# Patient Record
Sex: FEMALE | Race: OTHER | NOT HISPANIC OR LATINO | URBAN - METROPOLITAN AREA
[De-identification: names, ages, dates, MRNs, and addresses within clinical notes are randomized per-mention and may not be internally consistent; named-entity substitution may affect disease eponyms.]

---

## 2018-12-18 ENCOUNTER — EMERGENCY (EMERGENCY)
Facility: HOSPITAL | Age: 48
LOS: 1 days | Discharge: HOME | End: 2018-12-18
Attending: EMERGENCY MEDICINE | Admitting: OBSTETRICS & GYNECOLOGY
Payer: MEDICAID

## 2018-12-18 VITALS
TEMPERATURE: 98 F | RESPIRATION RATE: 16 BRPM | OXYGEN SATURATION: 100 % | DIASTOLIC BLOOD PRESSURE: 75 MMHG | HEART RATE: 95 BPM | SYSTOLIC BLOOD PRESSURE: 130 MMHG

## 2018-12-18 LAB
ALBUMIN SERPL ELPH-MCNC: 4.3 G/DL — SIGNIFICANT CHANGE UP (ref 3.3–5)
ALP SERPL-CCNC: 79 U/L — SIGNIFICANT CHANGE UP (ref 40–120)
ALT FLD-CCNC: 9 U/L — SIGNIFICANT CHANGE UP (ref 4–33)
APTT BLD: 27.9 SEC — SIGNIFICANT CHANGE UP (ref 27.5–36.3)
AST SERPL-CCNC: 18 U/L — SIGNIFICANT CHANGE UP (ref 4–32)
BASOPHILS # BLD AUTO: 0.05 K/UL — SIGNIFICANT CHANGE UP (ref 0–0.2)
BASOPHILS NFR BLD AUTO: 0.6 % — SIGNIFICANT CHANGE UP (ref 0–2)
BILIRUB SERPL-MCNC: < 0.2 MG/DL — LOW (ref 0.2–1.2)
BUN SERPL-MCNC: 7 MG/DL — SIGNIFICANT CHANGE UP (ref 7–23)
CALCIUM SERPL-MCNC: 8.9 MG/DL — SIGNIFICANT CHANGE UP (ref 8.4–10.5)
CHLORIDE SERPL-SCNC: 106 MMOL/L — SIGNIFICANT CHANGE UP (ref 98–107)
CO2 SERPL-SCNC: 25 MMOL/L — SIGNIFICANT CHANGE UP (ref 22–31)
CREAT SERPL-MCNC: 0.62 MG/DL — SIGNIFICANT CHANGE UP (ref 0.5–1.3)
EOSINOPHIL # BLD AUTO: 0.2 K/UL — SIGNIFICANT CHANGE UP (ref 0–0.5)
EOSINOPHIL NFR BLD AUTO: 2.2 % — SIGNIFICANT CHANGE UP (ref 0–6)
GLUCOSE SERPL-MCNC: 107 MG/DL — HIGH (ref 70–99)
HCG SERPL-ACNC: < 5 MIU/ML — SIGNIFICANT CHANGE UP
HCT VFR BLD CALC: 27.8 % — LOW (ref 34.5–45)
HGB BLD-MCNC: 8.1 G/DL — LOW (ref 11.5–15.5)
IMM GRANULOCYTES # BLD AUTO: 0.03 # — SIGNIFICANT CHANGE UP
IMM GRANULOCYTES NFR BLD AUTO: 0.3 % — SIGNIFICANT CHANGE UP (ref 0–1.5)
INR BLD: 1.2 — HIGH (ref 0.88–1.17)
LYMPHOCYTES # BLD AUTO: 2.64 K/UL — SIGNIFICANT CHANGE UP (ref 1–3.3)
LYMPHOCYTES # BLD AUTO: 29.5 % — SIGNIFICANT CHANGE UP (ref 13–44)
MCHC RBC-ENTMCNC: 20.4 PG — LOW (ref 27–34)
MCHC RBC-ENTMCNC: 29.1 % — LOW (ref 32–36)
MCV RBC AUTO: 70 FL — LOW (ref 80–100)
MONOCYTES # BLD AUTO: 0.51 K/UL — SIGNIFICANT CHANGE UP (ref 0–0.9)
MONOCYTES NFR BLD AUTO: 5.7 % — SIGNIFICANT CHANGE UP (ref 2–14)
NEUTROPHILS # BLD AUTO: 5.51 K/UL — SIGNIFICANT CHANGE UP (ref 1.8–7.4)
NEUTROPHILS NFR BLD AUTO: 61.7 % — SIGNIFICANT CHANGE UP (ref 43–77)
NRBC # FLD: 0 — SIGNIFICANT CHANGE UP
PLATELET # BLD AUTO: 354 K/UL — SIGNIFICANT CHANGE UP (ref 150–400)
PMV BLD: 9.6 FL — SIGNIFICANT CHANGE UP (ref 7–13)
POTASSIUM SERPL-MCNC: 3.2 MMOL/L — LOW (ref 3.5–5.3)
POTASSIUM SERPL-SCNC: 3.2 MMOL/L — LOW (ref 3.5–5.3)
PROT SERPL-MCNC: 7.6 G/DL — SIGNIFICANT CHANGE UP (ref 6–8.3)
PROTHROM AB SERPL-ACNC: 13.4 SEC — HIGH (ref 9.8–13.1)
RBC # BLD: 3.97 M/UL — SIGNIFICANT CHANGE UP (ref 3.8–5.2)
RBC # FLD: 19 % — HIGH (ref 10.3–14.5)
RH IG SCN BLD-IMP: POSITIVE — SIGNIFICANT CHANGE UP
SODIUM SERPL-SCNC: 142 MMOL/L — SIGNIFICANT CHANGE UP (ref 135–145)
WBC # BLD: 8.94 K/UL — SIGNIFICANT CHANGE UP (ref 3.8–10.5)
WBC # FLD AUTO: 8.94 K/UL — SIGNIFICANT CHANGE UP (ref 3.8–10.5)

## 2018-12-18 PROCEDURE — 99285 EMERGENCY DEPT VISIT HI MDM: CPT

## 2018-12-18 PROCEDURE — 76830 TRANSVAGINAL US NON-OB: CPT | Mod: 26

## 2018-12-18 NOTE — ED PROVIDER NOTE - GENITOURINARY, MLM
+moderate bleeding from os, +old clots in vaginal vault +moderate bleeding from os, +old clots in vaginal vault (chaperone SHIRLENE Sebastian)

## 2018-12-18 NOTE — ED PROVIDER NOTE - OBJECTIVE STATEMENT
49 y/o F with no PHMx c/o  nonstop heavy bleeding with chucks starting this morning. Pt reports using more than 24 pads. Liquid Advil taken today for cramps. Associated symptoms nausea, tiredness. Pt states this is the 2nd time also happening last month. Last period x3 weeks, regular period once a month. Pt went to GYN took tests sonargram. Reports results come in next month. No medication taken everyday. NKDA.   Denies dizziness, fever, vomiting. Next GYN appointment next month.

## 2018-12-18 NOTE — ED ADULT TRIAGE NOTE - CHIEF COMPLAINT QUOTE
Pt states she started having heavy vaginal bleeding since this morning.  Pt states that her LMP was 3 weeks ago.  Pt denies PMH, denies daily medications

## 2018-12-19 ENCOUNTER — TRANSCRIPTION ENCOUNTER (OUTPATIENT)
Age: 48
End: 2018-12-19

## 2018-12-19 VITALS
HEART RATE: 88 BPM | DIASTOLIC BLOOD PRESSURE: 67 MMHG | RESPIRATION RATE: 16 BRPM | SYSTOLIC BLOOD PRESSURE: 113 MMHG | OXYGEN SATURATION: 100 % | TEMPERATURE: 98 F

## 2018-12-19 DIAGNOSIS — N93.9 ABNORMAL UTERINE AND VAGINAL BLEEDING, UNSPECIFIED: ICD-10-CM

## 2018-12-19 DIAGNOSIS — Z98.51 TUBAL LIGATION STATUS: Chronic | ICD-10-CM

## 2018-12-19 LAB
ANISOCYTOSIS BLD QL: SIGNIFICANT CHANGE UP
BASOPHILS # BLD AUTO: 0.07 K/UL — SIGNIFICANT CHANGE UP (ref 0–0.2)
BASOPHILS NFR BLD AUTO: 0.9 % — SIGNIFICANT CHANGE UP (ref 0–2)
BASOPHILS NFR SPEC: 0 % — SIGNIFICANT CHANGE UP (ref 0–2)
BLASTS # FLD: 0 % — SIGNIFICANT CHANGE UP (ref 0–0)
BLD GP AB SCN SERPL QL: NEGATIVE — SIGNIFICANT CHANGE UP
EOSINOPHIL # BLD AUTO: 0.19 K/UL — SIGNIFICANT CHANGE UP (ref 0–0.5)
EOSINOPHIL NFR BLD AUTO: 2.5 % — SIGNIFICANT CHANGE UP (ref 0–6)
EOSINOPHIL NFR FLD: 0 % — SIGNIFICANT CHANGE UP (ref 0–6)
GIANT PLATELETS BLD QL SMEAR: PRESENT — SIGNIFICANT CHANGE UP
HCT VFR BLD CALC: 26.2 % — LOW (ref 34.5–45)
HCT VFR BLD CALC: 29.9 % — LOW (ref 34.5–45)
HGB BLD-MCNC: 7.4 G/DL — LOW (ref 11.5–15.5)
HGB BLD-MCNC: 9.1 G/DL — LOW (ref 11.5–15.5)
HYPOCHROMIA BLD QL: SIGNIFICANT CHANGE UP
IMM GRANULOCYTES # BLD AUTO: 0.01 # — SIGNIFICANT CHANGE UP
IMM GRANULOCYTES NFR BLD AUTO: 0.1 % — SIGNIFICANT CHANGE UP (ref 0–1.5)
LYMPHOCYTES # BLD AUTO: 2.26 K/UL — SIGNIFICANT CHANGE UP (ref 1–3.3)
LYMPHOCYTES # BLD AUTO: 30.2 % — SIGNIFICANT CHANGE UP (ref 13–44)
LYMPHOCYTES NFR SPEC AUTO: 20 % — SIGNIFICANT CHANGE UP (ref 13–44)
MCHC RBC-ENTMCNC: 19.7 PG — LOW (ref 27–34)
MCHC RBC-ENTMCNC: 21.4 PG — LOW (ref 27–34)
MCHC RBC-ENTMCNC: 28.2 % — LOW (ref 32–36)
MCHC RBC-ENTMCNC: 30.4 % — LOW (ref 32–36)
MCV RBC AUTO: 69.9 FL — LOW (ref 80–100)
MCV RBC AUTO: 70.4 FL — LOW (ref 80–100)
METAMYELOCYTES # FLD: 0 % — SIGNIFICANT CHANGE UP (ref 0–1)
MICROCYTES BLD QL: SIGNIFICANT CHANGE UP
MONOCYTES # BLD AUTO: 0.6 K/UL — SIGNIFICANT CHANGE UP (ref 0–0.9)
MONOCYTES NFR BLD AUTO: 8 % — SIGNIFICANT CHANGE UP (ref 2–14)
MONOCYTES NFR BLD: 3.5 % — SIGNIFICANT CHANGE UP (ref 2–9)
MYELOCYTES NFR BLD: 0 % — SIGNIFICANT CHANGE UP (ref 0–0)
NEUTROPHIL AB SER-ACNC: 72.2 % — SIGNIFICANT CHANGE UP (ref 43–77)
NEUTROPHILS # BLD AUTO: 4.36 K/UL — SIGNIFICANT CHANGE UP (ref 1.8–7.4)
NEUTROPHILS NFR BLD AUTO: 58.3 % — SIGNIFICANT CHANGE UP (ref 43–77)
NEUTS BAND # BLD: 0 % — SIGNIFICANT CHANGE UP (ref 0–6)
NRBC # FLD: 0 — SIGNIFICANT CHANGE UP
NRBC # FLD: 0 — SIGNIFICANT CHANGE UP
OTHER - HEMATOLOGY %: 0 — SIGNIFICANT CHANGE UP
PLATELET # BLD AUTO: 340 K/UL — SIGNIFICANT CHANGE UP (ref 150–400)
PLATELET # BLD AUTO: 371 K/UL — SIGNIFICANT CHANGE UP (ref 150–400)
PLATELET COUNT - ESTIMATE: NORMAL — SIGNIFICANT CHANGE UP
PMV BLD: 10 FL — SIGNIFICANT CHANGE UP (ref 7–13)
PMV BLD: 10.3 FL — SIGNIFICANT CHANGE UP (ref 7–13)
POIKILOCYTOSIS BLD QL AUTO: SLIGHT — SIGNIFICANT CHANGE UP
POLYCHROMASIA BLD QL SMEAR: SLIGHT — SIGNIFICANT CHANGE UP
PROMYELOCYTES # FLD: 0 % — SIGNIFICANT CHANGE UP (ref 0–0)
RBC # BLD: 3.75 M/UL — LOW (ref 3.8–5.2)
RBC # BLD: 4.25 M/UL — SIGNIFICANT CHANGE UP (ref 3.8–5.2)
RBC # FLD: 18.3 % — HIGH (ref 10.3–14.5)
RBC # FLD: 18.8 % — HIGH (ref 10.3–14.5)
RH IG SCN BLD-IMP: POSITIVE — SIGNIFICANT CHANGE UP
VARIANT LYMPHS # BLD: 4.3 % — SIGNIFICANT CHANGE UP
WBC # BLD: 7.49 K/UL — SIGNIFICANT CHANGE UP (ref 3.8–10.5)
WBC # BLD: 8.37 K/UL — SIGNIFICANT CHANGE UP (ref 3.8–10.5)
WBC # FLD AUTO: 7.49 K/UL — SIGNIFICANT CHANGE UP (ref 3.8–10.5)
WBC # FLD AUTO: 8.37 K/UL — SIGNIFICANT CHANGE UP (ref 3.8–10.5)

## 2018-12-19 RX ORDER — SODIUM CHLORIDE 9 MG/ML
1000 INJECTION, SOLUTION INTRAVENOUS
Qty: 0 | Refills: 0 | Status: DISCONTINUED | OUTPATIENT
Start: 2018-12-19 | End: 2018-12-19

## 2018-12-19 RX ORDER — ACETAMINOPHEN 500 MG
975 TABLET ORAL EVERY 6 HOURS
Qty: 0 | Refills: 0 | Status: DISCONTINUED | OUTPATIENT
Start: 2018-12-19 | End: 2018-12-19

## 2018-12-19 RX ORDER — IBUPROFEN 200 MG
600 TABLET ORAL EVERY 6 HOURS
Qty: 0 | Refills: 0 | Status: DISCONTINUED | OUTPATIENT
Start: 2018-12-19 | End: 2018-12-19

## 2018-12-19 NOTE — DISCHARGE NOTE ADULT - CARE PLAN
Principal Discharge DX:	Vaginal bleeding  Goal:	Follow up for fibroids  Assessment and plan of treatment:	Follow up with an OBGYN within one to two weeks.   Return to the ED if any heavy vaginal bleeding >2 pads per hour for over two hours, or if any lightheadness or dizziness.

## 2018-12-19 NOTE — DISCHARGE NOTE ADULT - INSTRUCTIONS
Call MD for fever greater than 101, nausea, vomiting, increased pain, increase vaginal bleeding, or go to ER.

## 2018-12-19 NOTE — PROGRESS NOTE ADULT - ASSESSMENT
48y  LMP  admitted with vaginal bleeding in setting of aborting myoma. Receiving 1U PRBC at this time for downtrending HCT. Hemodynamically asymptomatic at this time. 48y  LMP  admitted with vaginal bleeding in setting of SM myoma at os. Receiving 1U PRBC at this time for downtrending HCT. Hemodynamically asymptomatic at this time. 48y  LMP  admitted with vaginal bleeding and SM myoma.Receiving 1U PRBC at this time for downtrending HCT. Hemodynamically asymptomatic at this time.

## 2018-12-19 NOTE — DISCHARGE NOTE ADULT - PATIENT PORTAL LINK FT
You can access the [employer] Patient Portal, offered by [facility], by registering with the following website: [email or web address]

## 2018-12-19 NOTE — PROGRESS NOTE ADULT - SUBJECTIVE AND OBJECTIVE BOX
HD# 1    Patient admitted from ED with vaginal bleeding and found to have aborting myoma. Patient seen and examined at bedside this AM. No acute events since arriving on the floor. No acute complaints. Patient continues to have pelvic pain which is well controlled. Patient is ambulating without difficulty. Passing flatus. Voiding spontaneously. NPO overnight for possible vaginal myomectomy this AM. Last changed pad several hours ago. Denies lightheadedness/dizziness. Denies CP/SOB.     Vital Signs Last 24 Hours  T(C): 36.6 (12-19-18 @ 06:30), Max: 36.6 (12-18-18 @ 19:48)  HR: 90 (12-19-18 @ 06:30) (76 - 95)  BP: 112/71 (12-19-18 @ 06:30) (112/71 - 158/87)  RR: 20 (12-19-18 @ 06:30) (16 - 20)  SpO2: 98% (12-19-18 @ 06:30) (98% - 100%)    I&O's Summary      Physical Exam:  General: NAD  CV: NR, RR, S1, S2, no M/R/G  Lungs: CTA-B  Abdomen: Soft, R+LLQ tender to palpation, nondistended, no guarding/rebound+BS  : moderate old blood on pad  Ext: No pain or swelling    Labs:                        7.4    8.37  )-----------( 371      ( 19 Dec 2018 01:45 )             26.2   baso x      eos x      imm gran x      lymph x      mono x      poly x                            8.1    8.94  )-----------( 354      ( 18 Dec 2018 21:56 )             27.8   baso 0.6    eos 2.2    imm gran 0.3    lymph 29.5   mono 5.7    poly 61.7       MEDICATIONS  (STANDING):  lactated ringers. 1000 milliLiter(s) (125 mL/Hr) IV Continuous <Continuous>    MEDICATIONS  (PRN): HD# 1    Patient admitted from ED with vaginal bleeding and found to have SM myoma at os of cx. Patient seen and examined at bedside this AM. No acute events since arriving on the floor. No acute complaints. Patient continues to have pelvic pain which is well controlled. Patient is ambulating without difficulty. Passing flatus. Voiding spontaneously. NPO overnight for possible vaginal myomectomy this AM if active vaginal bleeding. Last changed pad several hours ago. Denies lightheadedness/dizziness. Denies CP/SOB.     Vital Signs Last 24 Hours  T(C): 36.6 (12-19-18 @ 06:30), Max: 36.6 (12-18-18 @ 19:48)  HR: 90 (12-19-18 @ 06:30) (76 - 95)  BP: 112/71 (12-19-18 @ 06:30) (112/71 - 158/87)  RR: 20 (12-19-18 @ 06:30) (16 - 20)  SpO2: 98% (12-19-18 @ 06:30) (98% - 100%)    I&O's Summary      Physical Exam:  General: NAD  CV: NR, RR, S1, S2, no M/R/G  Lungs: CTA-B  Abdomen: Soft, R+LLQ tender to palpation, nondistended, no guarding/rebound+BS  : moderate old blood on pad  Ext: No pain or swelling    Labs:                        7.4    8.37  )-----------( 371      ( 19 Dec 2018 01:45 )             26.2   baso x      eos x      imm gran x      lymph x      mono x      poly x                            8.1    8.94  )-----------( 354      ( 18 Dec 2018 21:56 )             27.8   baso 0.6    eos 2.2    imm gran 0.3    lymph 29.5   mono 5.7    poly 61.7       MEDICATIONS  (STANDING):  lactated ringers. 1000 milliLiter(s) (125 mL/Hr) IV Continuous <Continuous>    MEDICATIONS  (PRN): HD# 1    Patient admitted from ED with vaginal bleeding ,anemia and SM myoma. Patient seen and examined at bedside this AM. No acute events since arriving on the floor. No acute complaints. Patient continues to have pelvic pain which is well controlled. Patient is ambulating without difficulty. Passing flatus. Voiding spontaneously. NPO overnight for possible vaginal myomectomy this AM if active vaginal bleeding. Last changed pad several hours ago. Denies lightheadedness/dizziness. Denies CP/SOB. Pt with symptoma of pain and abnl uterine bleeding x 6 mos    Vital Signs Last 24 Hours  T(C): 36.6 (12-19-18 @ 06:30), Max: 36.6 (12-18-18 @ 19:48)  HR: 90 (12-19-18 @ 06:30) (76 - 95)  BP: 112/71 (12-19-18 @ 06:30) (112/71 - 158/87)  RR: 20 (12-19-18 @ 06:30) (16 - 20)  SpO2: 98% (12-19-18 @ 06:30) (98% - 100%)    I&O's Summary      Physical Exam:  General: NAD  CV: NR, RR, S1, S2, no M/R/G  Lungs: CTA-B  Abdomen: Soft, R+LLQ tender to palpation, nondistended, no guarding/rebound+BS  pelvic exam--no palpable myoma at the os nor aborting  Ext: No pain or swelling    Labs:                        7.4    8.37  )-----------( 371      ( 19 Dec 2018 01:45 )             26.2   baso x      eos x      imm gran x      lymph x      mono x      poly x                            8.1    8.94  )-----------( 354      ( 18 Dec 2018 21:56 )             27.8   baso 0.6    eos 2.2    imm gran 0.3    lymph 29.5   mono 5.7    poly 61.7       MEDICATIONS  (STANDING):  lactated ringers. 1000 milliLiter(s) (125 mL/Hr) IV Continuous <Continuous>    MEDICATIONS  (PRN):

## 2018-12-19 NOTE — H&P ADULT - NSHPLABSRESULTS_GEN_ALL_CORE
LABS:                        7.4    8.37  )-----------( 371      ( 19 Dec 2018 01:45 )             26.2     12-18    142  |  106  |  7   ----------------------------<  107<H>  3.2<L>   |  25  |  0.62    Ca    8.9      18 Dec 2018 21:56    TPro  7.6  /  Alb  4.3  /  TBili  < 0.2<L>  /  DBili  x   /  AST  18  /  ALT  9   /  AlkPhos  79  12-18    PT/INR - ( 18 Dec 2018 21:56 )   PT: 13.4 SEC;   INR: 1.20          PTT - ( 18 Dec 2018 21:56 )  PTT:27.9 SEC      Blood Type: A Positive      RADIOLOGY & ADDITIONAL STUDIES:  TVUS (12/19/18): Large posterior fibroid at the level of the lower uterine segment which appears to be submucosal measuring up to 6 x 3.1 x 4.1 cm.

## 2018-12-19 NOTE — H&P ADULT - NSHPPHYSICALEXAM_GEN_ALL_CORE
Vital Signs Last 24 Hrs  T(C): 36.6 (18 Dec 2018 19:48), Max: 36.6 (18 Dec 2018 19:48)  T(F): 97.9 (18 Dec 2018 19:48), Max: 97.9 (18 Dec 2018 19:48)  HR: 95 (18 Dec 2018 19:48) (95 - 95)  BP: 130/75 (18 Dec 2018 19:48) (130/75 - 130/75)  BP(mean): --  RR: 16 (18 Dec 2018 19:48) (16 - 16)  SpO2: 100% (18 Dec 2018 19:48) (100% - 100%)    PHYSICAL EXAM:      Constitutional: alert and oriented x 3    Respiratory: clear    Cardiovascular: regular rate and rhythm    Gastrointestinal: soft, non tender, + bowel sounds. No hepatosplenomegaly, no palpable masses    SSE: mild vaginal bleeding noted, mass noted within cervical os  SVE: cervix dilated to 1cm, mass noted within cervical os, no adnexal tenderness or masses    Extremities: no swelling or tenderness in b/l LE

## 2018-12-19 NOTE — DISCHARGE NOTE ADULT - HOSPITAL COURSE
Patient presented to the ED due to heavy vaginal bleeding.  Noted to have symptomatic anemia w/ Hct of 26.2.  TVUS showed myoma in lower uterine segment.  On exam concern for aborting myoma through the cervix however no active bleeding.  Patient transfused one unit w/ good H/H response- Hct 29.9.   Vitals stable.  Patient felt symptomatically improved.  Patient discharged home w/ follow up w/ an OBGYN for outpatient myomectomy scheduling.

## 2018-12-19 NOTE — ED ADULT NURSE NOTE - OBJECTIVE STATEMENT
Pt c/o vag bleed x 1d going through "a lot of pads." States mild abd cramping earlier - none now. Denies pain/dizziness. Repeat CBC sent to [facility]. Will monitor.

## 2018-12-19 NOTE — DISCHARGE NOTE ADULT - CONDITIONS AT DISCHARGE
Pt vital signs stable, tolerate diet well, voids without difficulty, continue to have vaginal bleeding. Pt denies dizziness at time. IV d/c before discharge.

## 2018-12-19 NOTE — H&P ADULT - HISTORY OF PRESENT ILLNESS
48y  LMP  who presents with heavy vaginal bleeding x1 day. She notes that she went through multiple pads today. She notes that she has been having heavy periods for the last 2-3 months. She saw her GYN 3 weeks ago, and a transvaginal ultrasound was done, but she never got the results. She denies any lightheadedness, dizziness, shortness of breath, constipation, diarrhea, dysuria, and vaginal discharge.        OB/GYN HISTORY: history of BTL,  x7, denies fibroids, ovarian cysts, STDs and abnormal paps    Name of GYN Physician:  [facility]

## 2018-12-19 NOTE — PROGRESS NOTE ADULT - ATTENDING COMMENTS
Pt currently stable w/o active bleeding.F/u as an oupt Pt currently stable w/o active bleeding.F/u as an oupt after transfusion and check H/h.Rec po FeSO4 and colace.F/u with endom bx as well as pap if cannot get records of such from the clinic she has been going to

## 2018-12-19 NOTE — H&P ADULT - ASSESSMENT
48y  LMP  who presents with heavy vaginal bleeding x1 day, found to have an aborting myoma, in stable condition.

## 2018-12-19 NOTE — DISCHARGE NOTE ADULT - PLAN OF CARE
Follow up for fibroids Follow up with an OBGYN within one to two weeks.   Return to the ED if any heavy vaginal bleeding >2 pads per hour for over two hours, or if any lightheadness or dizziness.

## 2018-12-19 NOTE — PROGRESS NOTE ADULT - PROBLEM SELECTOR PLAN 1
Neuro: po pain medications prn  CV: hemodynamically stable  Pulm: saturating well on room air, encourage incentive spirometry  GI: keep NPO   : voiding spontaneously   Heme: SCDs/OOB for DVT ppx  Dispo: floor    Lacie Robles PGY-1

## 2019-03-13 PROBLEM — Z00.00 ENCOUNTER FOR PREVENTIVE HEALTH EXAMINATION: Status: ACTIVE | Noted: 2019-03-13

## 2019-04-22 ENCOUNTER — APPOINTMENT (OUTPATIENT)
Dept: OBGYN | Facility: HOSPITAL | Age: 49
End: 2019-04-22

## 2019-06-07 NOTE — H&P ADULT - PROBLEM SELECTOR PLAN 1
Patient to return to Dr. Jung Means Thursday 3/14/19 for surgery.  Post-op with our office on Friday morning 3/15/19  @ 8:00. -will admit to GYN  -1upRBCs  -will monitor bleeding overnight  -if pt continues to bleed, will consider adding on patient for vaginal myomectomy  -NPO  -IVF  -monitor VS    Pt seen with Dr. Lizette Toribio MD PGY2

## 2021-10-15 ENCOUNTER — EMERGENCY (EMERGENCY)
Facility: HOSPITAL | Age: 51
LOS: 1 days | Discharge: HOME | End: 2021-10-15
Attending: STUDENT IN AN ORGANIZED HEALTH CARE EDUCATION/TRAINING PROGRAM
Payer: COMMERCIAL

## 2021-10-15 VITALS
OXYGEN SATURATION: 96 % | WEIGHT: 104.94 LBS | DIASTOLIC BLOOD PRESSURE: 84 MMHG | HEIGHT: 60 IN | RESPIRATION RATE: 17 BRPM | TEMPERATURE: 97 F | SYSTOLIC BLOOD PRESSURE: 129 MMHG | HEART RATE: 89 BPM

## 2021-10-15 DIAGNOSIS — Z98.51 TUBAL LIGATION STATUS: Chronic | ICD-10-CM

## 2021-10-15 PROCEDURE — 99284 EMERGENCY DEPT VISIT MOD MDM: CPT

## 2021-10-15 PROCEDURE — 99282 EMERGENCY DEPT VISIT SF MDM: CPT

## 2021-10-15 NOTE — ED PROVIDER NOTE - PHYSICAL EXAMINATION
Gen: AAOx3, non-toxic  Head: NCAT  HEENT: no facial or orbital bone tenderness/bruising/swelling. EOMI, oral mucosa moist, normal conjunctiva  Lung: CTAB, no respiratory distress, no wheezes/rhonchi/rales B/L, speaking in full sentences  CV: RRR, no murmurs, rubs or gallops  Abd: soft, NTND, no guarding, no CVA tenderness  MSK: no midline spinal TTP, no visible deformities  Neuro: No focal sensory or motor deficits, normal CN exam   Skin: Warm, well perfused, no rash/bruising/laceration   Psych: normal affect.     Vahid Adair, DO
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
## 2021-10-15 NOTE — ED PROVIDER NOTE - NS ED ROS FT
ROS:  GENERAL: No fever, no chills  EYES: no change in vision  HEENT: +pain over right cheek. no trouble swallowing, no trouble speaking  CARDIAC: no chest pain  PULMONARY: no cough, no shortness of breath  GI: +abd pain. no nausea, no vomiting, no diarrhea, no constipation  : No dysuria, no frequency, no change in appearance, or odor of urine  SKIN: no rashes  NEURO: no headache, no weakness  MSK: No joint pain    Vahid Adair DO
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
## 2025-06-24 NOTE — ED PROVIDER NOTE - PATIENT PORTAL LINK FT
independent/needs device
You can access the FollowMyHealth Patient Portal offered by [facility] by registering at the following website: [email or web address]. By joining [employer]’s FollowMyHealth portal, you will also be able to view your health information using other applications (apps) compatible with our system.